# Patient Record
Sex: MALE | Race: OTHER | HISPANIC OR LATINO | URBAN - METROPOLITAN AREA
[De-identification: names, ages, dates, MRNs, and addresses within clinical notes are randomized per-mention and may not be internally consistent; named-entity substitution may affect disease eponyms.]

---

## 2017-08-26 ENCOUNTER — EMERGENCY (EMERGENCY)
Facility: HOSPITAL | Age: 52
LOS: 0 days | Discharge: HOME | End: 2017-08-26
Admitting: FAMILY MEDICINE

## 2017-08-26 DIAGNOSIS — J18.9 PNEUMONIA, UNSPECIFIED ORGANISM: ICD-10-CM

## 2017-08-26 DIAGNOSIS — K21.9 GASTRO-ESOPHAGEAL REFLUX DISEASE WITHOUT ESOPHAGITIS: ICD-10-CM

## 2017-08-26 DIAGNOSIS — Z79.899 OTHER LONG TERM (CURRENT) DRUG THERAPY: ICD-10-CM

## 2017-08-26 DIAGNOSIS — R50.9 FEVER, UNSPECIFIED: ICD-10-CM

## 2017-08-26 DIAGNOSIS — B34.9 VIRAL INFECTION, UNSPECIFIED: ICD-10-CM

## 2017-08-29 ENCOUNTER — INPATIENT (INPATIENT)
Facility: HOSPITAL | Age: 52
LOS: 2 days | Discharge: HOME | End: 2017-09-01
Attending: HOSPITALIST

## 2017-08-29 DIAGNOSIS — J18.9 PNEUMONIA, UNSPECIFIED ORGANISM: ICD-10-CM

## 2017-09-06 DIAGNOSIS — K21.9 GASTRO-ESOPHAGEAL REFLUX DISEASE WITHOUT ESOPHAGITIS: ICD-10-CM

## 2017-09-06 DIAGNOSIS — I10 ESSENTIAL (PRIMARY) HYPERTENSION: ICD-10-CM

## 2017-09-06 DIAGNOSIS — E87.6 HYPOKALEMIA: ICD-10-CM

## 2017-09-06 DIAGNOSIS — A41.9 SEPSIS, UNSPECIFIED ORGANISM: ICD-10-CM

## 2017-09-06 DIAGNOSIS — E87.1 HYPO-OSMOLALITY AND HYPONATREMIA: ICD-10-CM

## 2017-09-06 DIAGNOSIS — D64.9 ANEMIA, UNSPECIFIED: ICD-10-CM

## 2017-09-06 DIAGNOSIS — J18.9 PNEUMONIA, UNSPECIFIED ORGANISM: ICD-10-CM

## 2017-09-06 DIAGNOSIS — R59.0 LOCALIZED ENLARGED LYMPH NODES: ICD-10-CM

## 2017-09-06 DIAGNOSIS — F31.9 BIPOLAR DISORDER, UNSPECIFIED: ICD-10-CM

## 2023-07-14 ENCOUNTER — APPOINTMENT (OUTPATIENT)
Dept: ORTHOPEDIC SURGERY | Facility: CLINIC | Age: 58
End: 2023-07-14
Payer: COMMERCIAL

## 2023-07-14 VITALS — BODY MASS INDEX: 31.14 KG/M2 | WEIGHT: 235 LBS | HEIGHT: 73 IN

## 2023-07-14 DIAGNOSIS — M72.2 PLANTAR FASCIAL FIBROMATOSIS: ICD-10-CM

## 2023-07-14 PROBLEM — Z00.00 ENCOUNTER FOR PREVENTIVE HEALTH EXAMINATION: Status: ACTIVE | Noted: 2023-07-14

## 2023-07-14 PROCEDURE — 99203 OFFICE O/P NEW LOW 30 MIN: CPT

## 2023-07-14 PROCEDURE — 73630 X-RAY EXAM OF FOOT: CPT | Mod: LT

## 2023-07-14 NOTE — DATA REVIEWED
[FreeTextEntry1] : X-ray images were obtained at the office today.  AP, lateral, oblique views of the left foot reveal a calcaneal spur.  No acute fractures or dislocations

## 2023-07-14 NOTE — DISCUSSION/SUMMARY
[de-identified] : Patient has Plantar fasciitis of the left foot.  Diagnosis and x-ray images were discussed with patient and his wife.  At this time, I gave patient a printout of Plantar fasciitis exercises to try.  I also encouraged him to take a water bottle and put in the freezer, and to roll his foot out with this every morning.  I also offered the patient a prescription strength anti-inflammatory, at this time he kindly declined.  Patient wanted to see a doctor today, so he is requesting a follow-up with soon as possible with Dr. Saha for discussion of cortisone injections.  Patient will follow-up with him at his next availability.  Patient is agreeable to above plan all questions were answered today

## 2023-07-14 NOTE — DISCUSSION/SUMMARY
[de-identified] : Patient has Plantar fasciitis of the left foot.  Diagnosis and x-ray images were discussed with patient and his wife.  At this time, I gave patient a printout of Plantar fasciitis exercises to try.  I also encouraged him to take a water bottle and put in the freezer, and to roll his foot out with this every morning.  I also offered the patient a prescription strength anti-inflammatory, at this time he kindly declined.  Patient wanted to see a doctor today, so he is requesting a follow-up with soon as possible with Dr. Saha for discussion of cortisone injections.  Patient will follow-up with him at his next availability.  Patient is agreeable to above plan all questions were answered today

## 2023-07-14 NOTE — HISTORY OF PRESENT ILLNESS
[de-identified] : 57-year-old male presents with left foot pain.  Patient had this pain since 7/11/2023, began atraumatically.  Patient has been having trouble walking on it.  Patient states it hurts the worse in the morning and then by the end of the day it loosens up and feels a little better.  Patient has taken naproxen prescription strength for pain relief.  Patient denies any past injuries or surgeries to the area.  Patient denies having pain like this before.

## 2023-07-14 NOTE — PHYSICAL EXAM
[de-identified] : Physical exam of the left foot:\par -No ecchymosis, edema, erythema present.  Skin intact\par -Over the plantar fascia, plantar aspect of calcaneus\par -Patient has full range of motion of the foot and ankle with no pain\par -+2 dorsalis pedis pulse \par -Sensation intact to light touch

## 2023-07-14 NOTE — PHYSICAL EXAM
[de-identified] : Physical exam of the left foot:\par -No ecchymosis, edema, erythema present.  Skin intact\par -Over the plantar fascia, plantar aspect of calcaneus\par -Patient has full range of motion of the foot and ankle with no pain\par -+2 dorsalis pedis pulse \par -Sensation intact to light touch

## 2023-07-14 NOTE — HISTORY OF PRESENT ILLNESS
[de-identified] : 57-year-old male presents with left foot pain.  Patient had this pain since 7/11/2023, began atraumatically.  Patient has been having trouble walking on it.  Patient states it hurts the worse in the morning and then by the end of the day it loosens up and feels a little better.  Patient has taken naproxen prescription strength for pain relief.  Patient denies any past injuries or surgeries to the area.  Patient denies having pain like this before.

## 2024-02-09 ENCOUNTER — EMERGENCY (EMERGENCY)
Facility: HOSPITAL | Age: 59
LOS: 0 days | Discharge: HOME | End: 2024-02-09
Attending: EMERGENCY MEDICINE
Payer: COMMERCIAL

## 2024-02-09 VITALS
SYSTOLIC BLOOD PRESSURE: 185 MMHG | DIASTOLIC BLOOD PRESSURE: 115 MMHG | RESPIRATION RATE: 16 BRPM | OXYGEN SATURATION: 100 % | WEIGHT: 229.94 LBS | HEART RATE: 77 BPM | TEMPERATURE: 98 F

## 2024-02-09 DIAGNOSIS — I10 ESSENTIAL (PRIMARY) HYPERTENSION: ICD-10-CM

## 2024-02-09 DIAGNOSIS — J98.11 ATELECTASIS: ICD-10-CM

## 2024-02-09 DIAGNOSIS — F17.200 NICOTINE DEPENDENCE, UNSPECIFIED, UNCOMPLICATED: ICD-10-CM
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
## 2024-02-09 DIAGNOSIS — S29.9XXA UNSPECIFIED INJURY OF THORAX, INITIAL ENCOUNTER: ICD-10-CM

## 2024-02-09 DIAGNOSIS — R07.81 PLEURODYNIA: ICD-10-CM

## 2024-02-09 PROCEDURE — 99283 EMERGENCY DEPT VISIT LOW MDM: CPT | Mod: 25

## 2024-02-09 PROCEDURE — 71046 X-RAY EXAM CHEST 2 VIEWS: CPT | Mod: 26

## 2024-02-09 PROCEDURE — 99284 EMERGENCY DEPT VISIT MOD MDM: CPT

## 2024-02-09 PROCEDURE — 71046 X-RAY EXAM CHEST 2 VIEWS: CPT

## 2024-02-09 RX ORDER — ACETAMINOPHEN 500 MG
975 TABLET ORAL ONCE
Refills: 0 | Status: COMPLETED | OUTPATIENT
Start: 2024-02-09 | End: 2024-02-09

## 2024-02-09 RX ORDER — LIDOCAINE 4 G/100G
1 CREAM TOPICAL ONCE
Refills: 0 | Status: COMPLETED | OUTPATIENT
Start: 2024-02-09 | End: 2024-02-09

## 2024-02-09 RX ADMIN — LIDOCAINE 1 PATCH: 4 CREAM TOPICAL at 17:37

## 2024-02-09 RX ADMIN — Medication 975 MILLIGRAM(S): at 17:38

## 2024-02-09 NOTE — ED PROVIDER NOTE - WR INTERPRETATION 1
No pneumothorax no hemothorax, does have some right midlung field atelectasis as well as questionable rib fracture

## 2024-02-09 NOTE — ED PROVIDER NOTE - PROGRESS NOTE DETAILS
pk: xray reviewed, slight atelectasis on the right, will dc with incentive spirometer and work note and pcp follow up

## 2024-02-09 NOTE — ED ADULT TRIAGE NOTE - CHIEF COMPLAINT QUOTE
Patient states two days ago he was hit in the ribs by a card door, went to urgent care and was told he fx two ribs. Patient came because the pain in is persistent when coughing. No sOB

## 2024-02-09 NOTE — ED PROVIDER NOTE - PATIENT PORTAL LINK FT
You can access the FollowMyHealth Patient Portal offered by [facility] by registering at the following website: [email or web address]. By joining [employer]’s FollowMyHealth portal, you will also be able to view your health information using other applications (apps) compatible with our system.

## 2024-02-09 NOTE — ED PROVIDER NOTE - CLINICAL SUMMARY MEDICAL DECISION MAKING FREE TEXT BOX
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  Patient counseled regarding conditions which should prompt return.

## 2024-02-09 NOTE — ED PROVIDER NOTE - OBJECTIVE STATEMENT
Patient is an 58-year-old male past medical history of hypertension presenting to ED for evaluation of right rib pain for the last 2 days.  Patient states he opened a car door that hit him in the side of the ribs, was seen in urgent care yesterday had a chest x-ray done, was requesting a work note but was referred to his primary care doctor.  Patient was seen by his primary care doctor yesterday and told that he needs to see an orthopedic for a work note, went to an orthopedic today and was told that he could not be seen for his rib injury so presents to the ER.  Patient has been taking Motrin without complete relief of his rib pain.  Was called from his urgent care just prior to arrival to the ED was told that he has a right rib fracture and a torturous aorta and that he should be seen in the ER. Otherwise denies any fever, chills, headache, changes in vision, cough, congestion, palpitations, sob, n/v/d, abd pain, constipation, urinary complaints, lower extremity pain/swelling.

## 2024-03-03 NOTE — ED PROVIDER NOTE - PHYSICAL EXAMINATION
Patient
CONSTITUTIONAL: well-appearing, in NAD  SKIN: Warm dry, normal skin turgor  HEAD: NCAT  EYES: EOMI, PERRLA, no scleral icterus, conjunctiva pink  ENT: normal pharynx with no erythema or exudates  NECK: Supple; non tender. Full ROM.  CARD: RRR, no murmurs.  CHEST: right lateral rib TTP  RESP: clear to ausculation b/l. No crackles or wheezing.  ABD: soft, non-tender, non-distended, no rebound or guarding.  EXT: Full ROM, no bony tenderness, no pedal edema, no calf tenderness  NEURO: normal motor. normal sensory. CN II-XII intact. Cerebellar testing normal. Normal gait.  PSYCH: Cooperative, appropriate.